# Patient Record
Sex: MALE | Race: WHITE | NOT HISPANIC OR LATINO | Employment: OTHER | URBAN - METROPOLITAN AREA
[De-identification: names, ages, dates, MRNs, and addresses within clinical notes are randomized per-mention and may not be internally consistent; named-entity substitution may affect disease eponyms.]

---

## 2017-11-22 ENCOUNTER — HOSPITAL ENCOUNTER (EMERGENCY)
Facility: MEDICAL CENTER | Age: 57
End: 2017-11-22
Attending: EMERGENCY MEDICINE
Payer: COMMERCIAL

## 2017-11-22 VITALS
BODY MASS INDEX: 34.43 KG/M2 | HEIGHT: 70 IN | TEMPERATURE: 97.8 F | DIASTOLIC BLOOD PRESSURE: 99 MMHG | WEIGHT: 240.52 LBS | RESPIRATION RATE: 18 BRPM | SYSTOLIC BLOOD PRESSURE: 145 MMHG | OXYGEN SATURATION: 94 % | HEART RATE: 110 BPM

## 2017-11-22 DIAGNOSIS — S13.4XXA WHIPLASH INJURY TO NECK, INITIAL ENCOUNTER: ICD-10-CM

## 2017-11-22 PROCEDURE — 99284 EMERGENCY DEPT VISIT MOD MDM: CPT

## 2017-11-22 ASSESSMENT — PAIN SCALES - GENERAL
PAINLEVEL_OUTOF10: 4
PAINLEVEL_OUTOF10: 3
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
## 2017-12-04 ENCOUNTER — APPOINTMENT (OUTPATIENT)
Dept: RADIOLOGY | Facility: MEDICAL CENTER | Age: 57
End: 2017-12-04
Attending: EMERGENCY MEDICINE
Payer: COMMERCIAL

## 2017-12-04 ENCOUNTER — HOSPITAL ENCOUNTER (EMERGENCY)
Facility: MEDICAL CENTER | Age: 57
End: 2017-12-04
Attending: EMERGENCY MEDICINE
Payer: COMMERCIAL

## 2017-12-04 VITALS
SYSTOLIC BLOOD PRESSURE: 125 MMHG | OXYGEN SATURATION: 98 % | DIASTOLIC BLOOD PRESSURE: 78 MMHG | HEART RATE: 90 BPM | RESPIRATION RATE: 18 BRPM | TEMPERATURE: 98.4 F | HEIGHT: 70 IN | WEIGHT: 230.82 LBS | BODY MASS INDEX: 33.05 KG/M2

## 2017-12-04 DIAGNOSIS — F07.81 POSTCONCUSSIVE SYNDROME: ICD-10-CM

## 2017-12-04 PROCEDURE — A9270 NON-COVERED ITEM OR SERVICE: HCPCS | Performed by: EMERGENCY MEDICINE

## 2017-12-04 PROCEDURE — 70450 CT HEAD/BRAIN W/O DYE: CPT

## 2017-12-04 PROCEDURE — 99284 EMERGENCY DEPT VISIT MOD MDM: CPT

## 2017-12-04 PROCEDURE — 700102 HCHG RX REV CODE 250 W/ 637 OVERRIDE(OP): Performed by: EMERGENCY MEDICINE

## 2017-12-04 PROCEDURE — 700111 HCHG RX REV CODE 636 W/ 250 OVERRIDE (IP): Performed by: EMERGENCY MEDICINE

## 2017-12-04 RX ORDER — LORAZEPAM 1 MG/1
1 TABLET ORAL ONCE
Status: COMPLETED | OUTPATIENT
Start: 2017-12-04 | End: 2017-12-04

## 2017-12-04 RX ORDER — LORAZEPAM 1 MG/1
1 TABLET ORAL EVERY 4 HOURS PRN
Qty: 15 TAB | Refills: 0 | Status: SHIPPED | OUTPATIENT
Start: 2017-12-04 | End: 2018-04-09

## 2017-12-04 RX ORDER — ONDANSETRON 4 MG/1
4 TABLET, ORALLY DISINTEGRATING ORAL EVERY 6 HOURS PRN
Qty: 10 TAB | Refills: 0 | Status: SHIPPED | OUTPATIENT
Start: 2017-12-04 | End: 2018-04-09

## 2017-12-04 RX ORDER — ONDANSETRON 4 MG/1
4 TABLET, ORALLY DISINTEGRATING ORAL ONCE
Status: COMPLETED | OUTPATIENT
Start: 2017-12-04 | End: 2017-12-04

## 2017-12-04 RX ADMIN — LORAZEPAM 1 MG: 1 TABLET ORAL at 11:36

## 2017-12-04 RX ADMIN — ONDANSETRON 4 MG: 4 TABLET, ORALLY DISINTEGRATING ORAL at 11:36

## 2017-12-04 NOTE — ED NOTES
Pt given discharge instructions/prescriptions/ home care instructions, pt verbalized understanding of instructions given, pt ambulatory to DANIEL null.
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
## 2017-12-04 NOTE — DISCHARGE INSTRUCTIONS
Post-Concussion Syndrome  Post-concussion syndrome describes the symptoms that can occur after a head injury. These symptoms can last from weeks to months.  CAUSES   It is not clear why some head injuries cause post-concussion syndrome. It can occur whether your head injury was mild or severe and whether you were wearing head protection or not.   SIGNS AND SYMPTOMS  · Memory difficulties.  · Dizziness.  · Headaches.  · Double vision or blurry vision.  · Sensitivity to light.  · Hearing difficulties.  · Depression.  · Tiredness.  · Weakness.  · Difficulty with concentration.  · Difficulty sleeping or staying asleep.  · Vomiting.  · Poor balance or instability on your feet.  · Slow reaction time.  · Difficulty learning and remembering things you have heard.  DIAGNOSIS   There is no test to determine whether you have post-concussion syndrome. Your health care provider may order an imaging scan of your brain, such as a CT scan, to check for other problems that may be causing your symptoms (such as a severe injury inside your skull).  TREATMENT   Usually, these problems disappear over time without medical care. Your health care provider may prescribe medicine to help ease your symptoms. It is important to follow up with a neurologist to evaluate your recovery and address any lingering symptoms or issues.  HOME CARE INSTRUCTIONS   · Take medicines only as directed by your health care provider. Do not take aspirin. Aspirin can slow blood clotting.  · Sleep with your head slightly elevated to help with headaches.  · Avoid any situation where there is potential for another head injury. This includes football, hockey, soccer, basketball, martial arts, downhill snow sports, and horseback riding. Your condition will get worse every time you experience a concussion. You should avoid these activities until you are evaluated by the appropriate follow-up health care providers.  · Keep all follow-up visits as directed by your health  care provider. This is important.  SEEK MEDICAL CARE IF:  · You have increased problems paying attention or concentrating.  · You have increased difficulty remembering or learning new information.  · You need more time to complete tasks or assignments than before.  · You have increased irritability or decreased ability to cope with stress.  · You have more symptoms than before.  Seek medical care if you have any of the following symptoms for more than two weeks after your injury:  · Lasting (chronic) headaches.  · Dizziness or balance problems.  · Nausea.  · Vision problems.  · Increased sensitivity to noise or light.  · Depression or mood swings.  · Anxiety or irritability.  · Memory problems.  · Difficulty concentrating or paying attention.  · Sleep problems.  · Feeling tired all the time.  SEEK IMMEDIATE MEDICAL CARE IF:  · You have confusion or unusual drowsiness.  · Others find it difficult to wake you up.  · You have nausea or persistent, forceful vomiting.  · You feel like you are moving when you are not (vertigo). Your eyes may move rapidly back and forth.  · You have convulsions or faint.  · You have severe, persistent headaches that are not relieved by medicine.  · You cannot use your arms or legs normally.  · One of your pupils is larger than the other.  · You have clear or bloody discharge from your nose or ears.  · Your problems are getting worse, not better.  MAKE SURE YOU:  · Understand these instructions.  · Will watch your condition.  · Will get help right away if you are not doing well or get worse.     This information is not intended to replace advice given to you by your health care provider. Make sure you discuss any questions you have with your health care provider.     Document Released: 06/09/2003 Document Revised: 01/08/2016 Document Reviewed: 03/25/2015  [employer] Interactive Patient Education ©2016 [employer]
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
## 2017-12-06 ENCOUNTER — OFFICE VISIT (OUTPATIENT)
Dept: NEUROLOGY | Facility: MEDICAL CENTER | Age: 57
End: 2017-12-06

## 2017-12-06 VITALS
HEIGHT: 70 IN | RESPIRATION RATE: 16 BRPM | TEMPERATURE: 97.2 F | WEIGHT: 235 LBS | OXYGEN SATURATION: 98 % | DIASTOLIC BLOOD PRESSURE: 70 MMHG | HEART RATE: 98 BPM | SYSTOLIC BLOOD PRESSURE: 128 MMHG | BODY MASS INDEX: 33.64 KG/M2

## 2017-12-06 DIAGNOSIS — S06.0X0S BRAIN CONCUSSION, WITHOUT LOSS OF CONSCIOUSNESS, SEQUELA (HCC): ICD-10-CM

## 2017-12-06 DIAGNOSIS — R42 SPELL OF DIZZINESS: ICD-10-CM

## 2017-12-06 PROBLEM — S06.0XAA BRAIN CONCUSSION: Status: ACTIVE | Noted: 2017-12-06

## 2017-12-06 PROCEDURE — 99204 OFFICE O/P NEW MOD 45 MIN: CPT | Performed by: PSYCHIATRY & NEUROLOGY

## 2017-12-06 ASSESSMENT — PATIENT HEALTH QUESTIONNAIRE - PHQ9
5. POOR APPETITE OR OVEREATING: 2 - MORE THAN HALF THE DAYS
CLINICAL INTERPRETATION OF PHQ2 SCORE: 5
SUM OF ALL RESPONSES TO PHQ QUESTIONS 1-9: 16
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
## 2017-12-14 ENCOUNTER — HOSPITAL ENCOUNTER (OUTPATIENT)
Dept: RADIOLOGY | Facility: MEDICAL CENTER | Age: 57
End: 2017-12-14
Attending: PSYCHIATRY & NEUROLOGY

## 2017-12-14 DIAGNOSIS — R42 SPELL OF DIZZINESS: ICD-10-CM

## 2017-12-14 DIAGNOSIS — S06.0X0S BRAIN CONCUSSION, WITHOUT LOSS OF CONSCIOUSNESS, SEQUELA (HCC): ICD-10-CM

## 2017-12-14 PROCEDURE — 70551 MRI BRAIN STEM W/O DYE: CPT

## 2017-12-18 ENCOUNTER — TELEPHONE (OUTPATIENT)
Dept: NEUROLOGY | Facility: MEDICAL CENTER | Age: 57
End: 2017-12-18

## 2017-12-19 NOTE — TELEPHONE ENCOUNTER
Yaneth Mosley M.D.  Martha Gama, Med Ass't   Caller: Unspecified (Yesterday, 10:04 AM)             MRI of brain-- not remarkable in my opinions     Spoke to patient gave above information. Told patient to make appointment if he has any new concerns.

## 2018-01-03 ENCOUNTER — OFFICE VISIT (OUTPATIENT)
Dept: NEUROLOGY | Facility: MEDICAL CENTER | Age: 58
End: 2018-01-03

## 2018-01-03 VITALS
TEMPERATURE: 98.2 F | WEIGHT: 222.6 LBS | BODY MASS INDEX: 31.87 KG/M2 | OXYGEN SATURATION: 97 % | HEIGHT: 70 IN | DIASTOLIC BLOOD PRESSURE: 72 MMHG | HEART RATE: 94 BPM | RESPIRATION RATE: 16 BRPM | SYSTOLIC BLOOD PRESSURE: 122 MMHG

## 2018-01-03 RX ORDER — NAPROXEN 500 MG/1
500 TABLET ORAL 2 TIMES DAILY WITH MEALS
COMMUNITY

## 2018-01-03 RX ORDER — GABAPENTIN 300 MG/1
300 CAPSULE ORAL 3 TIMES DAILY
COMMUNITY

## 2018-01-03 ASSESSMENT — PATIENT HEALTH QUESTIONNAIRE - PHQ9
SUM OF ALL RESPONSES TO PHQ QUESTIONS 1-9: 20
5. POOR APPETITE OR OVEREATING: 3 - NEARLY EVERY DAY
CLINICAL INTERPRETATION OF PHQ2 SCORE: 3
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
## 2018-01-04 ENCOUNTER — TELEPHONE (OUTPATIENT)
Dept: NEUROLOGY | Facility: MEDICAL CENTER | Age: 58
End: 2018-01-04

## 2018-01-10 ENCOUNTER — OFFICE VISIT (OUTPATIENT)
Dept: NEUROLOGY | Facility: MEDICAL CENTER | Age: 58
End: 2018-01-10

## 2018-01-10 VITALS
BODY MASS INDEX: 31.17 KG/M2 | TEMPERATURE: 97.5 F | SYSTOLIC BLOOD PRESSURE: 114 MMHG | HEIGHT: 70 IN | DIASTOLIC BLOOD PRESSURE: 76 MMHG | RESPIRATION RATE: 18 BRPM | HEART RATE: 96 BPM | OXYGEN SATURATION: 89 % | WEIGHT: 217.7 LBS

## 2018-01-10 DIAGNOSIS — F32.9 REACTIVE DEPRESSION: ICD-10-CM

## 2018-01-10 PROBLEM — F32.A DEPRESSION: Status: ACTIVE | Noted: 2018-01-10

## 2018-01-10 PROCEDURE — 99213 OFFICE O/P EST LOW 20 MIN: CPT | Performed by: PSYCHIATRY & NEUROLOGY

## 2018-01-10 RX ORDER — ESCITALOPRAM OXALATE 10 MG/1
10 TABLET ORAL DAILY
Qty: 30 TAB | Refills: 4 | Status: SHIPPED | OUTPATIENT
Start: 2018-01-10 | End: 2018-04-09

## 2018-01-10 ASSESSMENT — PATIENT HEALTH QUESTIONNAIRE - PHQ9
CLINICAL INTERPRETATION OF PHQ2 SCORE: 5
SUM OF ALL RESPONSES TO PHQ QUESTIONS 1-9: 25
5. POOR APPETITE OR OVEREATING: 3 - NEARLY EVERY DAY
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
## 2018-01-17 ENCOUNTER — TELEPHONE (OUTPATIENT)
Dept: NEUROLOGY | Facility: MEDICAL CENTER | Age: 58
End: 2018-01-17

## 2018-01-17 NOTE — TELEPHONE ENCOUNTER
Patient called today and wants to know if it's alright to start taking baby asa. He stated you told him not to take anymore asa.    Please advise    Thank you

## 2018-01-18 NOTE — TELEPHONE ENCOUNTER
Yaneth Mosley M.D.  Martha Gama, [facility]   Caller: Unspecified (Yesterday,  2:24 PM)             That is probably misunderstanding     Unless necessary, I would recommend the patient to reduce the intake of any symptomatic medication such as ativan or anti-dizziness medication     I rarely discussed with any patients with aspirine   Lots of patient have to take ASA for cardiac rhythms and I am not cardiologist     Spoke to patient gave above information. He will call with any new concerns.

## 2018-04-09 ENCOUNTER — OFFICE VISIT (OUTPATIENT)
Dept: NEUROLOGY | Facility: MEDICAL CENTER | Age: 58
End: 2018-04-09

## 2018-04-09 VITALS
DIASTOLIC BLOOD PRESSURE: 70 MMHG | BODY MASS INDEX: 30.05 KG/M2 | HEART RATE: 111 BPM | HEIGHT: 70 IN | WEIGHT: 209.88 LBS | TEMPERATURE: 97.7 F | SYSTOLIC BLOOD PRESSURE: 116 MMHG

## 2018-04-09 DIAGNOSIS — S06.0X0A BRAIN CONCUSSION, WITHOUT LOSS OF CONSCIOUSNESS, INITIAL ENCOUNTER: ICD-10-CM

## 2018-04-09 PROBLEM — R42 SPELL OF DIZZINESS: Status: RESOLVED | Noted: 2017-12-06 | Resolved: 2018-04-09

## 2018-04-09 PROCEDURE — 99214 OFFICE O/P EST MOD 30 MIN: CPT | Performed by: PSYCHIATRY & NEUROLOGY

## 2018-04-09 RX ORDER — TRAMADOL HYDROCHLORIDE 50 MG/1
50 TABLET ORAL EVERY 4 HOURS PRN
COMMUNITY

## 2018-04-09 RX ORDER — TRAZODONE HYDROCHLORIDE 50 MG/1
50 TABLET ORAL NIGHTLY
COMMUNITY

## 2018-04-09 RX ORDER — METHOCARBAMOL 750 MG/1
750 TABLET, FILM COATED ORAL 4 TIMES DAILY
COMMUNITY
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
## 2018-12-13 ENCOUNTER — HOSPITAL ENCOUNTER (OUTPATIENT)
Dept: RADIOLOGY | Facility: MEDICAL CENTER | Age: 58
End: 2018-12-13
Attending: NURSE PRACTITIONER
Payer: COMMERCIAL

## 2018-12-13 ENCOUNTER — PATIENT MESSAGE (OUTPATIENT)
Dept: NEUROLOGY | Facility: MEDICAL CENTER | Age: 58
End: 2018-12-13

## 2018-12-13 DIAGNOSIS — M48.02 SPINAL STENOSIS IN CERVICAL REGION: ICD-10-CM

## 2018-12-13 PROCEDURE — 72141 MRI NECK SPINE W/O DYE: CPT

## 2018-12-13 PROCEDURE — 72050 X-RAY EXAM NECK SPINE 4/5VWS: CPT

## 2018-12-19 ENCOUNTER — TELEPHONE (OUTPATIENT)
Dept: NEUROLOGY | Facility: MEDICAL CENTER | Age: 58
End: 2018-12-19

## 2018-12-19 NOTE — PATIENT COMMUNICATION
Spoke to Cristiane @ [email or web address] regarding  above  [employer] message from patient. She stated they have been getting a lot of complaints about these type of messages going out. She stated there's is no way to stop them from going out. She will call patient and let him know.

## 2019-06-11 ENCOUNTER — NON-PROVIDER VISIT (OUTPATIENT)
Dept: NEUROLOGY | Facility: MEDICAL CENTER | Age: 59
End: 2019-06-11
Payer: COMMERCIAL

## 2019-06-11 DIAGNOSIS — M47.22 OSTEOARTHRITIS OF SPINE WITH RADICULOPATHY, CERVICAL REGION: ICD-10-CM

## 2019-06-11 PROCEDURE — 95885 MUSC TST DONE W/NERV TST LIM: CPT | Mod: 59,LT | Performed by: SPECIALIST

## 2019-06-11 PROCEDURE — 95910 NRV CNDJ TEST 7-8 STUDIES: CPT | Performed by: SPECIALIST

## 2019-06-11 PROCEDURE — 95886 MUSC TEST DONE W/N TEST COMP: CPT | Mod: RT | Performed by: SPECIALIST

## 2019-06-11 NOTE — PROCEDURES
NERVE CONDUCTION STUDIES AND ELECTROMYOGRAPHY REPORT        06/11/19      Referring provider: Josse Roque M.D.      SUMMARY OF PATIENT'S CLINICAL HISTORY,PHYSICAL EXAM, AND RATIONALE FOR TESTING:    Mr. Jordan Sim 59 y.o. presenting with right upper extremity pain and numbness.    Past Medical History is significant for : No past medical history on file.        The electrodiagnostic studies were performed to evaluate for possible cervical radiculopathy.      ELECTRODIAGNOSTIC EXAMINATION:  Nerve conduction studies (NCS) and electromyography (EMG) are utilized to evaluate direct or indirect damage to the peripheral nervous system. NCS are performed to measure the nerve(s) response(s) to electrostimulation across a given nerve segment. EMG evaluates the passive and active electrical activity of the muscle(s) in question.  Muscles are innervated by specific peripheral nerves and roots. Often times, several nerves the muscle to be examined in order to determine the presence or absence of the disease process. Furthermore, nerves and muscles may need to be tested in a [id number] comparison, as well as in additional extremities, as this may be crucial in characterizing the extent of the disease process, which may be diffuse or isolated and of varying degree of severity. The extent of the neurodiagnostic exam is justified as it may help arrive to a proper diagnosis, which ultimately may contribute to better management of the patient. Therefore, the nerves to muscles examined during the study were medically necessary.    Unless otherwise noted, temperature of the extremity(s) study was monitored before and during the examination and remained between 32 and 36 degrees C for the upper extremities, and between 30 and 36 degrees C for the lower extremities.      NERVE CONDUCTION STUDIES:  Sensory nerves:  - Bilateral Median sensory nerves were examined.The responses were within normal limits.  - Bilateral Ulnar sensory  nerves were examined. The responses were within normal limits.  Motor nerves:   - Bilateral Median motor nerves were examined. Recording electrodes placed at the Abductor Pollicis Brevis muscles. The responses were within normal limits.  - Bilateral Ulnar motor nerves were examined. Recording electrodes placed at the Abductor Digiti Minimi muscles. The responses were mildly abnormal bilaterally.  Distal latencies were within normal limits, amplitude were within normal limits, there was mild slowing bilaterally across the elbows.          ELECTROMYOGRAPHY:  The study was performed the concentric needle electrode. Fibrillation and fasciculation activity is graded by convention from none (0) to continuous (4+).  Needle electrode examination was performed in the following muscles: Bilateral deltoid, biceps, triceps, abductor pollicis brevis and first dorsal interosseous, right flexor pollicis brevis.  Acute denervation changes consisting of fibrillation potentials were noted in the right abductor pollicis brevis and flexor pollicis brevis, both median nerve supplied, but not in the C8 ulnar nerve supplied first dorsal interosseous.  Other muscles studied were normal electrophysiologically.      Nerve Conduction Studies     Stim Site NR Onset (ms) Norm Onset (ms) O-P Amp (µV) Norm O-P Amp Site1 Site2 Delta-P (ms) Dist (cm) Jayce (m/s) Norm Jayce (m/s)   Left Median Anti Sensory (2nd Digit)  36.5°C   Wrist    2.5 <3.6 11.9 >10 Wrist 2nd Digit 3.3 14.0 *42 >50   Right Median Anti Sensory (2nd Digit)  36.5°C   Wrist    2.3 <3.6 19.1 >10 Wrist 2nd Digit 3.3 14.0 *42 >50   Left Ulnar Anti Sensory (5th Digit)  36.5°C   Wrist    2.8 <3.1 *7.5 >10 Wrist 5th Digit 3.4 14.0 *41 >50   Right Ulnar Anti Sensory (5th Digit)  36.5°C   Wrist    2.3 <3.1 11.6 >10 Wrist 5th Digit 3.0 14.0 *47 >50        Stim Site NR Onset (ms) Norm Onset (ms) O-P Amp (mV) Norm O-P Amp Site1 Site2 Delta-0 (ms) Dist (cm) Jayce (m/s) Norm Jayce (m/s)   Left Median  Motor (Abd Poll Brev)   Wrist    3.5 <4 11.4 >6 Elbow Wrist 4.9 26.0 53 >50   Elbow    8.4  11.4          Right Median Motor (Abd Poll Brev)  36.5°C   Wrist    3.6 <4 12.1 >6 Elbow Wrist 5.0 26.5 53 >50   Elbow    8.6  11.6          Left Ulnar Motor (Abd Dig Min)  36.5°C   Wrist    3.0 <3.1 8.4 >7 B Elbow Wrist 4.3 22.5 52 >50   B Elbow    7.3  7.0  A Elbow B Elbow 2.5 10.0 40    A Elbow    9.8  6.6          Right Ulnar Motor (Abd Dig Min)  36.5°C   Wrist    2.7 <3.1 7.3 >7 B Elbow Wrist 4.8 22.5 *47 >50   B Elbow    7.5  7.1  A Elbow B Elbow 2.2 10.0 45    A Elbow    9.7  5.7                                Electromyography     Side Muscle Nerve Root Ins Act Fibs Psw Amp Dur Poly Recrt Int Pat Comment   Right Deltoid Axillary C5-6 Nml Nml Nml Nml Nml 0 Nml Nml    Right Biceps Musculocut C5-6 Nml Nml Nml Nml Nml 0 Nml Nml    Right Triceps Radial C6-7-8 Nml Nml Nml Nml Nml 0 Nml Nml    Right Abd Poll Brev Median C8-T1 *Incr *1+ Nml Nml Nml 0 Nml Nml    Right 1stDorInt Ulnar C8-T1 Nml Nml Nml Nml Nml 0 Nml Nml    Right FlexPolBrevis Median C8-T1 *Incr *1+ Nml Nml Nml 0 Nml Nml    Left Deltoid Axillary C5-6 Nml Nml Nml Nml Nml 0 Nml Nml    Left Biceps Musculocut C5-6 Nml Nml Nml Nml Nml 0 Nml Nml          DIAGNOSTIC INTERPRETATION:  Extensive electrodiagnostic studies were performed to the bilateral upper extremities.  Results are as follows:    1.  Acute denervation changes consisting of fibrillation potentials were noted in the right abductor pollicis brevis and flexor pollicis brevis.  Both are C8 median nerve supplied muscles.  The C8 ulnar nerve supplied first dorsal interosseous muscle is normal electrophysiologically.  Nerve conduction studies do not show a carpal tunnel syndrome on the right side.  This could represent either a C8 radiculopathy, lower brachial plexopathy, or proximal median neuropathy.  Clinical correlation is suggested.    2.  Mild ulnar nerve slowing across the elbow bilaterally without motor  unit changes in ulnar nerve supplied hand muscles.    3.  Normal EMG and nerve conduction study left upper extremity.      CLINICAL DISCUSSION:    The finding of acute denervation changes in to C8 median nerve supplied muscles on the right side but not in the C8 ulnar nerve supplied first dorsal interosseous is somewhat atypical.  It could be consistent with either a C8 radiculopathy, lower brachial plexopathy, or proximal median neuropathy.  The patient's complaint of dysesthetic pain in a C8 distribution is more consistent with the first 2 diagnostic possibilities.  Clinical correlation is suggested.    RONAN Marcus M.D.